# Patient Record
Sex: FEMALE | Race: WHITE | NOT HISPANIC OR LATINO
[De-identification: names, ages, dates, MRNs, and addresses within clinical notes are randomized per-mention and may not be internally consistent; named-entity substitution may affect disease eponyms.]

---

## 2019-04-27 ENCOUNTER — TRANSCRIPTION ENCOUNTER (OUTPATIENT)
Age: 74
End: 2019-04-27

## 2022-03-29 PROBLEM — Z00.00 ENCOUNTER FOR PREVENTIVE HEALTH EXAMINATION: Status: ACTIVE | Noted: 2022-03-29

## 2024-04-02 NOTE — PHYSICAL EXAM
[General Appearance - Alert] : alert [General Appearance - In No Acute Distress] : in no acute distress [General Appearance - Well Nourished] : well nourished [General Appearance - Well Developed] : well developed [Oriented To Time, Place, And Person] : oriented to person, place, and time [Cranial Nerves Optic (II)] : visual acuity intact bilaterally,  pupils equal round and reactive to light [Cranial Nerves Oculomotor (III)] : extraocular motion intact [Cranial Nerves Trigeminal (V)] : facial sensation intact symmetrically [Cranial Nerves Facial (VII)] : face symmetrical [Cranial Nerves Glossopharyngeal (IX)] : tongue and palate midline [Cranial Nerves Vestibulocochlear (VIII)] : hearing was intact bilaterally [Cranial Nerves Accessory (XI - Cranial And Spinal)] : head turning and shoulder shrug symmetric [Motor Strength] : muscle strength was normal in all four extremities [Cranial Nerves Hypoglossal (XII)] : there was no tongue deviation with protrusion [Sensation Tactile Decrease] : light touch was intact [Abnormal Walk] : normal gait [Balance] : balance was intact

## 2024-04-08 ENCOUNTER — TRANSCRIPTION ENCOUNTER (OUTPATIENT)
Age: 79
End: 2024-04-08

## 2024-04-08 ENCOUNTER — APPOINTMENT (OUTPATIENT)
Dept: NEUROSURGERY | Facility: CLINIC | Age: 79
End: 2024-04-08
Payer: MEDICARE

## 2024-04-08 DIAGNOSIS — D32.9 BENIGN NEOPLASM OF MENINGES, UNSPECIFIED: ICD-10-CM

## 2024-04-08 PROCEDURE — 99205 OFFICE O/P NEW HI 60 MIN: CPT

## 2024-04-08 NOTE — HISTORY OF PRESENT ILLNESS
[de-identified] : Ms. Booker was seen in neurosurgical consultation following recent hospitalization.  She is a 78 year-old female PMHx temporal arteritis, TIA on baby aspirin, squamous cell carcinoma, glaucoma and GERD.  She presented to [facility] ER on 2/26/24 s/p fall.  Had been diagnosed with COVID 2 days prior to presentation on home test and had been feeling increasingly weak, lethargic.  This resulted in a syncopal episode and fall in which she landed on right shoulder, prompting ER evaluation.  CT head non-contrast demonstrated a partially calcified right frontoparietal convexity lesion most consistent with a small meningioma.  She was instructed to follow up with neurosurgery following discharge.

## 2024-04-08 NOTE — DATA REVIEWED
[de-identified] : PATIENT NAME:		NEW COVINGTON			YOB: 1945	 ORDERING MD:		Diane Sarabia						 PRIMARY CARE MD:		Scott Lanier MD			ATTENDING MD:			 MEDICAL REC#:		[id number]			ACCOUNT#:		[id number]	 LOCATION:		[facility] 			ORDER DATE/TIME:		02/26/24 1040	 PROCEDURE:		CT CERVICAL SPINE; CT HEAD OR BRAIN						  ORDER #:		[id number]; [id number]						 CC:				;		;		; End of cc's  History: fall syncope covid.   Head CT  2/25/2024  Cervical CT 2/25/2024  Thin axial sections through the head obtained from occiput to vertex. Coronal and sagittal reformatted images provided.  Thin axial sections to the cervical spine obtained from occiput through C7. Coronal and sagittal formatted images provided.  Comparison made to CT head 12/19/2017 and MRI brain 12/19/2017  Head CT:   There is prominence of the cortical sulci, fissures and ventricles related to generalized volume loss. There is mild to moderate periventricular white matter hypodensity likely related to small vessel ischemic disease. There are small right gangliocapsular lacunar infarct. There is no midline shift. There is no acute intracranial  hemorrhage. There is a right frontoparietal convexity 1 cm extra-axial partially calcified dural based lesion suggestive meningioma suspected of mild increase in size in retrospect since prior study. There is no surrounding edema.  There is no acute calvarial fracture. There is bilateral ocular lens replacement. There is mild paranasal sinus mucosal disease. There is bilateral chronic inferior mastoid bony sclerosis with opacified air cells is of chronic mastoiditis.  Cervical CT:  There is straightening of cervical lordosis. There is slight anterolisthesis of C2 on C3 and C3 on C4. There is slight retrolisthesis of C7 on T1. There is mild anterolisthesis of T1 on T2 and T2 on T3. The craniocervical junction and C1-C2 relationship is maintained. There is no acute fracture.  There is multilevel cervical spondylosis and degenerative disc disease greatest at C4-5 and C5-6 with resultant mild central spinal stenoses. There is bilateral facet arthritis and uncovertebral disease resulting in mild bony foraminal stenoses.  There is no paraspinal hematoma.  Images of the upper thorax reveals no apical consolidation or pneumothorax.  IMPRESSION:  No acute intracranial hemorrhage, midline shift or calvarial fracture.  Mild to moderate white matter small vessel ischemic disease. Old right gangliocapsular lacunar infarct.  Mild increase in size of 1 cm right frontoparietal convexity partially calcified extra-axial dural based lesion suggestive meningioma. No surrounding edema.  No acute cervical spine fracture.  Multilevel cervical spondylosis, spondylolisthesis, degenerative disc disease and facet arthritis.  --- End of Report ---  			Electronically Signed: 			____________________________________________ 			Johnny Aguilar MD 			02/26/24 6394

## 2024-04-08 NOTE — ASSESSMENT
[FreeTextEntry1] : Ms. Booker presents status post fall.  CT head non-contrast 2/26/24 reviewed independently by me demonstrates a partially calcified right frontoparietal convexity extra-axial lesion most consistent with a partially calcified, small meningioma.  On retrospective image review, this lesion was present on CT head non-contrast 12/19/2017.  There has been perhaps modest interval growth.  I have recommended an MRI of the brain with and without gadolinium to include a frameless protocol with an appointment to follow. Pending these results, this MRI will likely serve as a new baseline for surveillance imaging.  I have asked the patient and her daughter to contact me for any symptomatic development or progression at which time we can obtain expedited follow up imaging.  A total of 60 minutes was spent relative to this encounter.

## 2024-05-06 ENCOUNTER — RESULT REVIEW (OUTPATIENT)
Age: 79
End: 2024-05-06

## 2024-05-21 ENCOUNTER — APPOINTMENT (OUTPATIENT)
Dept: NEUROSURGERY | Facility: CLINIC | Age: 79
End: 2024-05-21
Payer: MEDICARE

## 2024-05-21 PROCEDURE — 99215 OFFICE O/P EST HI 40 MIN: CPT

## 2024-05-21 PROCEDURE — G2211 COMPLEX E/M VISIT ADD ON: CPT

## 2024-05-21 NOTE — DATA REVIEWED
[de-identified] : 	 [facility]                                          [address] [place] [address]                                             [phone]  PATIENT NAME:           NEW COVINGTON                      YOB: 1945 ORDERING MD:           Jhoan Metcalf MD PRIMARY CARE MD:           Scott Lanier MD                      ATTENDING MD:           Jean Paul Metcalf MD MEDICAL REC#:           [id number]                       ACCOUNT#:             [id number] LOCATION:             [facility]                            ORDER DATE/TIME:           05/06/24 1118 PROCEDURE:            MRI BRAIN [facility] IC  ORDER #:              [id number] CC:                                         ;                     ;                     ; End of cc's  PROCEDURE: MRI brain with and without contrast, 5/6/2024  TECHNIQUE: 1.5 Jenni magnet. Multiplanar/multisequence imaging with and without intravenous contrast.  INTRAVENOUS CONTRAST: 5 mL Gadavist. 5 mL discarded.  COMPARISON: CT 2/26/2024, MRI 12/19/2017  IMAGING FINDINGS: Corresponding to partially calcified right parietal convexity lesion demonstrated on CT scan, is a homogeneously enhancing dural based lesion typical of meningioma. On axial contrast image 53 series 903, it measures 10 mm in AP diameter by 9 mm in width. On coronal image 59 series 902, the lesion measures 11 mm in height. On axial noncontrast image 17 series 401, the lesion measures 9 mm in AP diameter by 8 mm in width. Equivalent measurements on noncontrast image 16 series 7 of the previous MRI were 8 mm in AP diameter by 8 mm in width. There is mild compression of adjacent gyri/sulci, but no surrounding vasogenic edema. The lesion is located adjacent to the posterior aspect of the sylvian fissure (sagittal image 132 series 901). The lesion does not demonstrate restricted diffusion. Restricted diffusion would suggest a highly cellular tumor if it was present.  T2-weighted and FLAIR images demonstrate multiple foci and small confluent areas of abnormal high signal intensity in the supratentorial white matter and basal ganglia. Foci are nonspecific, but are most likely related to chronic small vessel ischemia/infarction. Small vessel vascular disease has progressed since 2017, but there is no MRI evidence of recent infarction with high signal on diffusion imaging. No cortical infarction is demonstrated. No posterior fossa infarction is demonstrated. Normal vascular flow voids are present indicating there is no large vessel arterial occlusion.  There is no hydrocephalus.  There is no extra-axial hematoma.  No neoplastic replacement of bone marrow is demonstrated. No hyperostosis is demonstrated adjacent to the meningioma.  Bilateral intraocular lens implants are demonstrated.  There is a small retention cyst in the left maxillary sinus. There is minimal ethmoid sinus mucosal thickening.  Moderate bilateral mastoid air cell mucosal thickening has mildly increased since 2017.   IMPRESSION: Homogeneously enhancing extra-axial lesion arising from the right parietal convexity typical of meningioma. Lesion has minimally enlarged since 2017. Mild local mass effect, but no surrounding vasogenic edema.  Moderate findings of chronic small vessel ischemia/infarction have increased since 2017.  --- End of Report ---           Electronically Signed:          ____________________________________________          Lior Cano MD          05/07/24 1317

## 2024-05-21 NOTE — ASSESSMENT
[FreeTextEntry1] : Ms. Booker presents with history of intracranial meningioma.  MRI brain with and without gadolinium 5/6/24 reviewed independently by me demonstrates a homogenously enhancing mass along the right parietal convexity, consistent with meningioma. The lesion has had perhaps very modest enlargement when compared to MRI without contrast performed in 2017, allowing for differences in technique. The patient remains asymptomatic. There is no evidence for subjacent vasogenic edema or significant mass effect.  We discussed surgical resection, radiosurgery, surveillance imaging. I have recommended that we continue with clinical and surveillance imaging given the above clinical and imaging findings. We will therefore see her back in the office in 1 year with MRI +/- brain to include frameless protocol prior to visit.   I have asked the patient and her daughter to contact me for any symptomatic development or progression at which time we can obtain expedited follow up imaging.  A total of 60 minutes was spent relative to this encounter.

## 2024-05-21 NOTE — HISTORY OF PRESENT ILLNESS
[FreeTextEntry1] : Ms. Booker presents for neurosurgical follow up.  Previous note revealed.  Presents with updated imaging, detailed below.  Reports no new neurologic symptoms.   Previous visit:   4/8/24: Ms. Booker was seen in neurosurgical consultation following recent hospitalization. She is a 78 year-old female PMHx temporal arteritis, TIA on baby aspirin, squamous cell carcinoma, glaucoma and GERD. She presented to [facility] ER on 2/26/24 s/p fall. Had been diagnosed with COVID 2 days prior to presentation on home test and had been feeling increasingly weak, lethargic. This resulted in a syncopal episode and fall in which she landed on right shoulder, prompting ER evaluation. CT head non-contrast demonstrated a partially calcified right frontoparietal convexity lesion most consistent with a small meningioma. She was instructed to follow up with neurosurgery following discharge.

## 2024-09-25 ENCOUNTER — NON-APPOINTMENT (OUTPATIENT)
Age: 79
End: 2024-09-25

## 2024-10-02 ENCOUNTER — NON-APPOINTMENT (OUTPATIENT)
Age: 79
End: 2024-10-02

## 2024-10-02 DIAGNOSIS — E55.9 VITAMIN D DEFICIENCY, UNSPECIFIED: ICD-10-CM

## 2024-10-02 DIAGNOSIS — Z86.73 PERSONAL HISTORY OF TRANSIENT ISCHEMIC ATTACK (TIA), AND CEREBRAL INFARCTION W/OUT RESIDUAL DEFICITS: ICD-10-CM

## 2024-10-02 DIAGNOSIS — E78.2 MIXED HYPERLIPIDEMIA: ICD-10-CM

## 2024-10-02 DIAGNOSIS — M81.0 AGE-RELATED OSTEOPOROSIS W/OUT CURRENT PATHOLOGICAL FRACTURE: ICD-10-CM

## 2024-10-02 DIAGNOSIS — I10 ESSENTIAL (PRIMARY) HYPERTENSION: ICD-10-CM

## 2024-10-02 DIAGNOSIS — Z86.007 PERSONAL HISTORY OF IN-SITU NEOPLASM OF SKIN: ICD-10-CM

## 2024-10-02 RX ORDER — PHENOL 1.4 %
600 AEROSOL, SPRAY (ML) MUCOUS MEMBRANE
Refills: 0 | Status: ACTIVE | COMMUNITY

## 2024-10-02 RX ORDER — BRIMONIDINE TARTRATE 1 MG/ML
0.1 SOLUTION/ DROPS OPHTHALMIC TWICE DAILY
Refills: 0 | Status: ACTIVE | COMMUNITY

## 2024-10-02 RX ORDER — ATORVASTATIN CALCIUM 20 MG/1
20 TABLET, FILM COATED ORAL
Refills: 0 | Status: ACTIVE | COMMUNITY

## 2024-10-02 RX ORDER — PANTOPRAZOLE 20 MG/1
20 TABLET, DELAYED RELEASE ORAL
Refills: 0 | Status: ACTIVE | COMMUNITY

## 2024-10-02 RX ORDER — ASPIRIN 325 MG/1
325 TABLET, FILM COATED ORAL DAILY
Refills: 0 | Status: ACTIVE | COMMUNITY

## 2024-10-02 RX ORDER — CHLORHEXIDINE GLUCONATE 4 %
LIQUID (ML) TOPICAL
Refills: 0 | Status: ACTIVE | COMMUNITY

## 2024-10-02 RX ORDER — LOSARTAN POTASSIUM 25 MG/1
25 TABLET, FILM COATED ORAL
Refills: 0 | Status: ACTIVE | COMMUNITY

## 2024-10-02 RX ORDER — ONDANSETRON 4 MG/1
4 TABLET ORAL
Refills: 0 | Status: ACTIVE | COMMUNITY

## 2024-10-03 ENCOUNTER — NON-APPOINTMENT (OUTPATIENT)
Age: 79
End: 2024-10-03

## 2024-10-03 ENCOUNTER — APPOINTMENT (OUTPATIENT)
Dept: NEUROLOGY | Facility: CLINIC | Age: 79
End: 2024-10-03
Payer: MEDICARE

## 2024-10-03 VITALS
DIASTOLIC BLOOD PRESSURE: 93 MMHG | WEIGHT: 117 LBS | OXYGEN SATURATION: 98 % | HEART RATE: 70 BPM | BODY MASS INDEX: 20.73 KG/M2 | HEIGHT: 63 IN | SYSTOLIC BLOOD PRESSURE: 157 MMHG

## 2024-10-03 DIAGNOSIS — D32.9 BENIGN NEOPLASM OF MENINGES, UNSPECIFIED: ICD-10-CM

## 2024-10-03 DIAGNOSIS — R26.89 OTHER ABNORMALITIES OF GAIT AND MOBILITY: ICD-10-CM

## 2024-10-03 DIAGNOSIS — R51.9 HEADACHE, UNSPECIFIED: ICD-10-CM

## 2024-10-03 DIAGNOSIS — Z86.73 PERSONAL HISTORY OF TRANSIENT ISCHEMIC ATTACK (TIA), AND CEREBRAL INFARCTION W/OUT RESIDUAL DEFICITS: ICD-10-CM

## 2024-10-03 DIAGNOSIS — R42 DIZZINESS AND GIDDINESS: ICD-10-CM

## 2024-10-03 PROCEDURE — 99205 OFFICE O/P NEW HI 60 MIN: CPT

## 2024-10-05 ENCOUNTER — TRANSCRIPTION ENCOUNTER (OUTPATIENT)
Age: 79
End: 2024-10-05

## 2024-10-05 PROBLEM — Z86.73 HISTORY OF TIA (TRANSIENT ISCHEMIC ATTACK): Status: RESOLVED | Noted: 2024-10-05 | Resolved: 2024-10-05

## 2024-10-05 LAB
25(OH)D3 SERPL-MCNC: 36.3 NG/ML
ALBUMIN SERPL ELPH-MCNC: 4.5 G/DL
ALP BLD-CCNC: 87 U/L
ALT SERPL-CCNC: 13 U/L
ANION GAP SERPL CALC-SCNC: 12 MMOL/L
AST SERPL-CCNC: 20 U/L
BASOPHILS # BLD AUTO: 0.03 K/UL
BASOPHILS NFR BLD AUTO: 0.3 %
BILIRUB SERPL-MCNC: 0.3 MG/DL
BUN SERPL-MCNC: 13 MG/DL
CALCIUM SERPL-MCNC: 9.6 MG/DL
CHLORIDE SERPL-SCNC: 102 MMOL/L
CO2 SERPL-SCNC: 25 MMOL/L
CREAT SERPL-MCNC: 0.78 MG/DL
EGFR: 78 ML/MIN/1.73M2
EOSINOPHIL # BLD AUTO: 0.06 K/UL
EOSINOPHIL NFR BLD AUTO: 0.7 %
FOLATE SERPL-MCNC: 15.4 NG/ML
GLUCOSE SERPL-MCNC: 100 MG/DL
HCT VFR BLD CALC: 43.3 %
HGB BLD-MCNC: 13.8 G/DL
IMM GRANULOCYTES NFR BLD AUTO: 0.1 %
LYMPHOCYTES # BLD AUTO: 2.44 K/UL
LYMPHOCYTES NFR BLD AUTO: 27.4 %
MAN DIFF?: NORMAL
MCHC RBC-ENTMCNC: 31.7 PG
MCHC RBC-ENTMCNC: 31.9 GM/DL
MCV RBC AUTO: 99.5 FL
MONOCYTES # BLD AUTO: 0.74 K/UL
MONOCYTES NFR BLD AUTO: 8.3 %
NEUTROPHILS # BLD AUTO: 5.63 K/UL
NEUTROPHILS NFR BLD AUTO: 63.2 %
PLATELET # BLD AUTO: 251 K/UL
POTASSIUM SERPL-SCNC: 5 MMOL/L
PROT SERPL-MCNC: 7.4 G/DL
RBC # BLD: 4.35 M/UL
RBC # FLD: 13.9 %
SODIUM SERPL-SCNC: 139 MMOL/L
T4 FREE SERPL-MCNC: 1.2 NG/DL
TSH SERPL-ACNC: 1.66 UIU/ML
VIT B12 SERPL-MCNC: 490 PG/ML
WBC # FLD AUTO: 8.91 K/UL

## 2024-10-05 RX ORDER — MILK THISTLE 150 MG
CAPSULE ORAL
Refills: 0 | Status: ACTIVE | COMMUNITY

## 2024-10-05 RX ORDER — FAMOTIDINE 20 MG/1
20 TABLET, FILM COATED ORAL
Refills: 0 | Status: ACTIVE | COMMUNITY

## 2024-10-05 NOTE — CONSULT LETTER
[Dear  ___] : Dear  [unfilled], [Consult Letter:] : I had the pleasure of evaluating your patient, [unfilled]. [Please see my note below.] : Please see my note below. [Consult Closing:] : Thank you very much for allowing me to participate in the care of this patient.  If you have any questions, please do not hesitate to contact me. [Sincerely,] : Sincerely, [DrKrupa  ___] : Dr. MOSLEY [FreeTextEntry3] : Stephany Bernard NP

## 2024-10-05 NOTE — ASSESSMENT
[FreeTextEntry1] : Ms. NEW COVINGTON is a 79 year old female here today in neurology consultation of dizziness which began approx 6 weeks ago in July 2024 with no pattern or trigger. Saw her pcp who sent her to ENT (unrevealing), who recommended neurology consultation. Hx meningioma, last seen by Dr Dixon Costello May 2024 prior to onset of symptoms. Last MRI Brain 5/6/2024 which revealed meningioma. Pt reports intermittent palpitations. Hx TIA on asa 325mg and atorvastatin.   DDX BPPV vs vestibular migraine vs metabolic abnormality vs arrhythmia vs cerebrovascular event   PLAN OF CARE - Lab work ordered  MRI Brain waw  Cardiology referral recommended. Pt would like to speak to her pcp for his preferred cardiologist.  Begin Magnesium Glycinate 400mg nightly. May improve sleep and decrease brief headaches.  Physical Therapy ordered for unsteadiness on feet  Water- goal 64oz water daily (8 cups) Exercise- goal 30 mins x 5 days weekly. Consider a brisk walk Sleep hygiene recommended   Hypertension - monitor home BP, log results and bring to PCP visits. BP goal < 130/80. > 110/60.  Lipid panel - 12 hour fasting lipid panel. Goal LDL < 70. Schedule f/u with your pcp to discuss your symptoms of recurrent palpitations.   Lifestyle modifications, diet and exercise, limit alcohol intake-   Neurology follow-up- with Dr Burton in 3 months for symptoms of dizziness without identifiable pattern.   Recommendations for Brain Health - - healthy eating/diet with diet rich in fiber, fruits, vegetables, and low in saturated fats, processed foods. - good sleep, 7-8 hours a night. No use of phone or watching television before bed. - aerobic exercise as tolerated, at least 30 minutes, such as a brisk walk 3-4 times a week. - keep mind active with puzzles, reading, and socializing with others. - abstaining from excess alcohol, or drug use. - blood pressure, cholesterol, blood glucose monitoring to prevent microvascular disease which can lead to cognitive impairment. - obtain adequate water intake. 8 cups of water daily or as recommended by your pcp if fluid restricted related to medical condition.

## 2024-10-05 NOTE — ADDENDUM
[FreeTextEntry1] : Chart review done. Medication reconciliation completed. Medication education provided. Chronic medical condition education reviewed. Fall and safety precautions reinforced. Instructed pt/surrogate to call office for questions or concerns. Schedule follow-up appointment as recommended. Referrals and testing ordered where indicated. Plan of care reviewed, and questions answered.
[FreeTextEntry1] : Chart review done. Medication reconciliation completed. Medication education provided. Chronic medical condition education reviewed. Fall and safety precautions reinforced. Instructed pt/surrogate to call office for questions or concerns. Schedule follow-up appointment as recommended. Referrals and testing ordered where indicated. Plan of care reviewed, and questions answered.
No

## 2024-10-05 NOTE — HISTORY OF PRESENT ILLNESS
[FreeTextEntry1] : Ms. NEW COVINGTON is a 79 year old female here today in neurology consultation of dizziness. Hx meningioma, temporal arteritis, HLD, HTN, TIA on aspirin, squamous cell carcinoma, tongue cancer with mass removal, glaucoma, osteoporosis, vitamin D def, carpal tunnel sgy, cataract sgy, Mohs sgy and GERD.   NEW reports transient lightheadedness and dizziness that began July 2024. Symptoms are brief and intermittent with no specific pattern. Dizziness is described as a floating sensation and followed by brief vertex headaches shortly after, headache is described as an achiness. These episodes are brief and resolve without medications. Patient was evaluated by otolaryngologist Dr. Spear from [facility], who recommended a neurology referral.  Previous ENT evaluations were directed by Dr. Aram Luciano, who suggested elimination of chocolate, tomatoes, and alcohol from patient's diet.   Onset - gradual onset July 2024 lasting 5 mins, a few weeks later the symptoms recurred once again, felt mildly light headed, brief achiness affecting top of the head that lasts 30 seconds.    Frequency - no identifiable pattern, symptoms occur maybe twice a week according to pt.   Duration of symptoms - seconds, intermittent    Symptoms including - mild dizziness, imbalance, floating sensation, followed by brief vertex headaches shortly after, described as an achiness.   Associated symptoms - no spinning sensation, (+) lightheadedness, (+) disequilibrium, no hearing loss, no tinnitus, no n/v, (+) brief headache, no visual disturbances, no weakness/numbness, moment of difficulty walking/standing during event, **experiencing weekly palpitations.    Triggers - no pattern identified by pt, occurring any time of day, NOT triggered by positional changes/head movements/stress/loud noises.    Hx - TIA, Bell's palsy with mildly decreased right hearing. NO head injury, no migraines, no cardiovascular conditions, no vestibular disorders, no neuro conditions.    Medications - asa 325mg atorvastatin 20mg calcium losartan 25mg pantoprazole 20mg probiotic   Walking unaided - yes  Exercise - 3 x weekly gym Water - 1L trying to drink more water  Sleep - taking tylenol pm half tab for poor sleep    Radiology testing -  5/6/2024 MRI BRAIN WAW Homogeneously enhancing extra-axial lesion arising from the right parietal convexity typical of meningioma. Lesion has minimally enlarged since 2017. Mild local mass effect, but no surrounding vasogenic edema. Moderate findings of chronic small vessel ischemia/infarction have increased since 2017. NeuroSx - Recommendation for clinical and surveillance imaging  2/26/2024 Cr 0.74, GFR 79, Cr Cl 55.8. hgb 12.6.   /93 during visit. Pt agrees to recheck BP at home and report an abnormality to pcp. Pt reports her usual BP is 130/70 range.   PCP Dr Yannick Watsonx Dr Metcalf ENT Dr Spear

## 2024-10-05 NOTE — PHYSICAL EXAM
[FreeTextEntry1] : Physical Exam In NAD Mood appropriate   Mental Status Awake, alert and oriented to person, place, time and situation. Provides detailed history. Fluent Speech.  Cranial Nerves II: VFF III, IV, VI: PERRL, EOMI. V: Facial sensation is normal B/L. VII: Facial strength is symmetric. VIII: wnl to voice.  IX, X: Palate is midline and elevates symmetrically. XI: Trapezius normal strength. XII: Tongue midline without atrophy or fasciculations. No obvious sign of tongue surgery visible. Underside of tongue with white appearance.   MOTOR EXAM Muscle tone - no evidence of rigidity or resistance in all 4 extremities. No atrophy or fasciculations. Muscle Strength: arms and legs, proximal and distal flexors and extensors are normal. No UE drift. Subtle tremor L > R with hands outstretched    REFLEXES All present, normal, and symmetrical Right Plantar: mute. Left Plantar: mute.   COORDINATION Finger to nose: Normal. Heel to shin: Normal.   SENSORY Vibration: intact  Sensation: light touch, pin prick distribution and cold intact   GAIT Normal. Tandem walk normal. Romberg negative.

## 2024-10-07 ENCOUNTER — NON-APPOINTMENT (OUTPATIENT)
Age: 79
End: 2024-10-07

## 2024-12-13 NOTE — PHYSICAL EXAM
[FreeTextEntry1] : Constitutional: alert, in no acute distress, well nourished and well developed. Psychiatric: oriented to person, place, and time.   Cranial Nerves: visual acuity intact bilaterally, pupils equal round and reactive to light, extraocular motion intact, facial sensation intact symmetrically, right facial asymmetry (prior Bell's Palsy), hearing was intact bilaterally, tongue and palate midline, head turning and shoulder shrug symmetric and there was no tongue deviation with protrusion.   Motor: muscle strength was normal in all four extremities.   Sensory exam: light touch was intact.   Coordination:. normal gait. balance was intact.   
2

## 2025-05-08 ENCOUNTER — RESULT REVIEW (OUTPATIENT)
Age: 80
End: 2025-05-08

## 2025-05-19 ENCOUNTER — APPOINTMENT (OUTPATIENT)
Dept: NEUROSURGERY | Facility: CLINIC | Age: 80
End: 2025-05-19
Payer: MEDICARE

## 2025-05-19 PROCEDURE — G2211 COMPLEX E/M VISIT ADD ON: CPT | Mod: 93

## 2025-05-19 PROCEDURE — 99215 OFFICE O/P EST HI 40 MIN: CPT | Mod: 93
